# Patient Record
Sex: FEMALE | Race: BLACK OR AFRICAN AMERICAN | NOT HISPANIC OR LATINO | Employment: FULL TIME | URBAN - METROPOLITAN AREA
[De-identification: names, ages, dates, MRNs, and addresses within clinical notes are randomized per-mention and may not be internally consistent; named-entity substitution may affect disease eponyms.]

---

## 2023-08-04 PROBLEM — N95.0 POSTMENOPAUSAL BLEEDING: Status: ACTIVE | Noted: 2023-08-04

## 2023-08-04 PROBLEM — R93.89 THICKENED ENDOMETRIUM: Status: ACTIVE | Noted: 2023-08-04

## 2023-08-04 PROBLEM — N88.8 CERVICAL MASS: Status: ACTIVE | Noted: 2023-08-04

## 2023-08-04 PROBLEM — R42 VERTIGO: Status: ACTIVE | Noted: 2023-08-04

## 2023-08-14 PROBLEM — C80.1 CARCINOSARCOMA: Status: ACTIVE | Noted: 2023-08-14

## 2023-08-21 PROBLEM — C54.9: Status: ACTIVE | Noted: 2023-08-21

## 2023-08-21 PROBLEM — D63.0 ANEMIA IN NEOPLASTIC DISEASE: Status: ACTIVE | Noted: 2023-08-21

## 2023-08-21 PROBLEM — D50.0 IRON DEFICIENCY ANEMIA DUE TO CHRONIC BLOOD LOSS: Status: ACTIVE | Noted: 2023-08-21

## 2023-08-21 PROBLEM — C55 CARCINOSARCOMA OF UTERUS: Status: ACTIVE | Noted: 2023-08-14

## 2023-08-28 PROBLEM — Z51.11 ENCOUNTER FOR ANTINEOPLASTIC CHEMOTHERAPY AND IMMUNOTHERAPY: Status: ACTIVE | Noted: 2023-08-28

## 2023-08-28 PROBLEM — Z51.12 ENCOUNTER FOR ANTINEOPLASTIC CHEMOTHERAPY AND IMMUNOTHERAPY: Status: ACTIVE | Noted: 2023-08-28

## 2024-05-24 PROBLEM — D64.9 ANEMIA: Status: ACTIVE | Noted: 2024-05-24

## 2024-05-24 PROBLEM — E83.39 HYPERPHOSPHATEMIA: Status: ACTIVE | Noted: 2024-05-24

## 2024-05-24 PROBLEM — K56.609 SMALL BOWEL OBSTRUCTION: Status: ACTIVE | Noted: 2024-05-24

## 2024-05-24 PROBLEM — E87.1 HYPONATREMIA: Status: ACTIVE | Noted: 2024-05-24

## 2024-05-24 PROBLEM — Z98.890 S/P EXPLORATORY LAPAROTOMY: Status: ACTIVE | Noted: 2024-05-24

## 2024-05-24 PROBLEM — N17.9 AKI (ACUTE KIDNEY INJURY): Status: ACTIVE | Noted: 2024-05-24

## 2024-05-24 PROBLEM — C55 UTERINE CANCER: Status: ACTIVE | Noted: 2024-05-24

## 2024-05-26 PROBLEM — R35.89 POLYURIA: Status: ACTIVE | Noted: 2024-05-26

## 2024-05-26 PROBLEM — C78.6 CARCINOMATOSIS PERITONEI: Status: ACTIVE | Noted: 2024-05-26

## 2024-05-26 PROBLEM — N17.9 AKI (ACUTE KIDNEY INJURY): Status: RESOLVED | Noted: 2024-05-24 | Resolved: 2024-05-26

## 2024-06-17 ENCOUNTER — PATIENT OUTREACH (OUTPATIENT)
Dept: ADMINISTRATIVE | Facility: OTHER | Age: 69
End: 2024-06-17

## 2024-06-17 NOTE — PROGRESS NOTES
CHW - Case Closure    LETICIA AGUIRRE RN completed outreach for case management referral     CHW will close episode

## 2024-06-21 PROBLEM — E83.39 HYPOPHOSPHATEMIA: Status: ACTIVE | Noted: 2024-06-21

## 2024-06-21 PROBLEM — E87.6 HYPOKALEMIA: Status: ACTIVE | Noted: 2024-06-21
